# Patient Record
Sex: MALE | Race: WHITE
[De-identification: names, ages, dates, MRNs, and addresses within clinical notes are randomized per-mention and may not be internally consistent; named-entity substitution may affect disease eponyms.]

---

## 2019-10-27 ENCOUNTER — HOSPITAL ENCOUNTER (EMERGENCY)
Dept: HOSPITAL 12 - ER | Age: 79
Discharge: HOME | End: 2019-10-27
Payer: COMMERCIAL

## 2019-10-27 VITALS — BODY MASS INDEX: 30.06 KG/M2 | HEIGHT: 70 IN | WEIGHT: 210 LBS

## 2019-10-27 VITALS — DIASTOLIC BLOOD PRESSURE: 59 MMHG | SYSTOLIC BLOOD PRESSURE: 93 MMHG

## 2019-10-27 DIAGNOSIS — Z79.82: ICD-10-CM

## 2019-10-27 DIAGNOSIS — Z79.899: ICD-10-CM

## 2019-10-27 DIAGNOSIS — F41.9: ICD-10-CM

## 2019-10-27 DIAGNOSIS — F03.90: Primary | ICD-10-CM

## 2019-10-27 DIAGNOSIS — R53.1: ICD-10-CM

## 2019-10-27 DIAGNOSIS — R05: ICD-10-CM

## 2019-10-27 DIAGNOSIS — I10: ICD-10-CM

## 2019-10-27 DIAGNOSIS — R06.02: ICD-10-CM

## 2019-10-27 DIAGNOSIS — Z86.73: ICD-10-CM

## 2019-10-27 LAB
ALP SERPL-CCNC: 93 U/L (ref 50–136)
ALT SERPL W/O P-5'-P-CCNC: 21 U/L (ref 16–63)
AST SERPL-CCNC: 17 U/L (ref 15–37)
BASOPHILS # BLD AUTO: 0 K/UL (ref 0–8)
BASOPHILS NFR BLD AUTO: 0.3 % (ref 0–2)
BILIRUB DIRECT SERPL-MCNC: 0.1 MG/DL (ref 0–0.2)
BILIRUB SERPL-MCNC: 0.3 MG/DL (ref 0.2–1)
BUN SERPL-MCNC: 17 MG/DL (ref 7–18)
CHLORIDE SERPL-SCNC: 107 MMOL/L (ref 98–107)
CO2 SERPL-SCNC: 24 MMOL/L (ref 21–32)
CREAT SERPL-MCNC: 1.5 MG/DL (ref 0.6–1.3)
EOSINOPHIL # BLD AUTO: 0.2 K/UL (ref 0–0.7)
EOSINOPHIL NFR BLD AUTO: 2.8 % (ref 0–7)
GLUCOSE SERPL-MCNC: 105 MG/DL (ref 74–106)
HCT VFR BLD AUTO: 36.2 % (ref 36.7–47.1)
HGB BLD-MCNC: 12 G/DL (ref 12.5–16.3)
LYMPHOCYTES # BLD AUTO: 1.6 K/UL (ref 20–40)
LYMPHOCYTES NFR BLD AUTO: 23.3 % (ref 20.5–51.5)
MCH RBC QN AUTO: 32 UUG (ref 23.8–33.4)
MCHC RBC AUTO-ENTMCNC: 33 G/DL (ref 32.5–36.3)
MCV RBC AUTO: 96.6 FL (ref 73–96.2)
MONOCYTES # BLD AUTO: 0.5 K/UL (ref 2–10)
MONOCYTES NFR BLD AUTO: 7.8 % (ref 0–11)
NEUTROPHILS # BLD AUTO: 4.6 K/UL (ref 1.8–8.9)
NEUTROPHILS NFR BLD AUTO: 65.8 % (ref 38.5–71.5)
PLATELET # BLD AUTO: 301 K/UL (ref 152–348)
POTASSIUM SERPL-SCNC: 4.3 MMOL/L (ref 3.5–5.1)
RBC # BLD AUTO: 3.74 MIL/UL (ref 4.06–5.63)
WBC # BLD AUTO: 7.1 K/UL (ref 3.6–10.2)
WS STN SPEC: 7.9 G/DL (ref 6.4–8.2)

## 2019-10-27 PROCEDURE — A4663 DIALYSIS BLOOD PRESSURE CUFF: HCPCS

## 2019-10-27 NOTE — NUR
Report given to EMTs for transfer, Pt left Er via gurney in stable condition. 
Patient discharged to home in stable conditon.  Written and verbal after care 
instructions given. 

Patient verbalizes understanding of instructions.